# Patient Record
Sex: FEMALE | URBAN - METROPOLITAN AREA
[De-identification: names, ages, dates, MRNs, and addresses within clinical notes are randomized per-mention and may not be internally consistent; named-entity substitution may affect disease eponyms.]

---

## 2017-03-10 ENCOUNTER — TELEPHONE (OUTPATIENT)
Dept: TRANSPLANT | Facility: CLINIC | Age: 62
End: 2017-03-10

## 2017-03-10 NOTE — TELEPHONE ENCOUNTER
Génesis Daviddominic called and stated that she is interested in becoming a living donor for her sister, Nupur Keller.  Medical and social history obtained.  No contraindications noted.  All questions answered.  Informed that we are currently evaluating another donor and she will be called if additional donors need to be tested.